# Patient Record
Sex: MALE | Race: WHITE
[De-identification: names, ages, dates, MRNs, and addresses within clinical notes are randomized per-mention and may not be internally consistent; named-entity substitution may affect disease eponyms.]

---

## 2019-03-30 ENCOUNTER — HOSPITAL ENCOUNTER (EMERGENCY)
Dept: HOSPITAL 7 - FB.ED | Age: 19
Discharge: HOME | End: 2019-03-30
Payer: MEDICAID

## 2019-03-30 DIAGNOSIS — F32.9: ICD-10-CM

## 2019-03-30 DIAGNOSIS — R45.87: ICD-10-CM

## 2019-03-30 DIAGNOSIS — F34.1: Primary | ICD-10-CM

## 2019-03-30 LAB — APAP SERPL-MCNC: < 2 UG/ML (ref ?–2)

## 2019-03-30 PROCEDURE — 84443 ASSAY THYROID STIM HORMONE: CPT

## 2019-03-30 PROCEDURE — 81001 URINALYSIS AUTO W/SCOPE: CPT

## 2019-03-30 PROCEDURE — 80305 DRUG TEST PRSMV DIR OPT OBS: CPT

## 2019-03-30 PROCEDURE — 96374 THER/PROPH/DIAG INJ IV PUSH: CPT

## 2019-03-30 PROCEDURE — G0480 DRUG TEST DEF 1-7 CLASSES: HCPCS

## 2019-03-30 PROCEDURE — 93005 ELECTROCARDIOGRAM TRACING: CPT

## 2019-03-30 PROCEDURE — 99283 EMERGENCY DEPT VISIT LOW MDM: CPT

## 2019-03-30 PROCEDURE — 36415 COLL VENOUS BLD VENIPUNCTURE: CPT

## 2019-03-30 PROCEDURE — 85025 COMPLETE CBC W/AUTO DIFF WBC: CPT

## 2019-03-30 PROCEDURE — 96361 HYDRATE IV INFUSION ADD-ON: CPT

## 2019-03-30 PROCEDURE — 80053 COMPREHEN METABOLIC PANEL: CPT

## 2019-03-30 PROCEDURE — C9113 INJ PANTOPRAZOLE SODIUM, VIA: HCPCS

## 2019-03-30 NOTE — EDM.PDOC
ED HPI GENERAL MEDICAL PROBLEM





- General


Stated Complaint: OVERDOSE


Time Seen by Provider: 03/30/19 05:40


Source of Information: Reports: Patient


History Limitations: Reports: No Limitations





- History of Present Illness


INITIAL COMMENTS - FREE TEXT/NARRATIVE: 





This pleasant 22-year-old [facility] [employer] 

major student with know depression and hx of parents divorce at 1 year of age, 

and father known to have suicidal ideation and his father had hx of being 

treated in [facility] for six weeks. 





At 3:30 AM today patient took 100 tablets ibuprofen and 3 tablets of 100 

milligrams Zoloft. He drove from [place] to [place] to his mother's house. 

Mother then prompted brought him to the ED.





He is is suicidal "thinks about it sometimes," feels helpless and hopeless, 

feels guilty, has decreased energy, concentration is variable and approximately 

50% of the time is decreased, he feels anxious, is sad, does not have delusions 

or hallucinations nor does he have homicidal thoughts.


And his been treated for depression with Zoloft her milligrams daily





His weight has increased lately. Currently weighs 20 pounds.





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 

its first performance Thursday April 4th.





Mother notes he "takes on too much." "He is overwhelmed" and she thinks "his 

grades are in trouble."





No history of previous suicidal behavior. He has had long-standing depression





ED ROS GENERAL





- Review of Systems


Review Of Systems: ROS reveals no pertinent complaints other than HPI.


Constitutional: Reports: No Symptoms


HEENT: Reports: No Symptoms


Respiratory: Reports: Other (History of asthma)


Cardiovascular: Reports: No Symptoms


Endocrine: Reports: No Symptoms


GI/Abdominal: Reports: No Symptoms


: Reports: No Symptoms


Musculoskeletal: Reports: No Symptoms


Skin: Reports: No Symptoms


Neurological: Reports: No Symptoms


Psychiatric: Reports: Depression


Hematologic/Lymphatic: Reports: No Symptoms


Immunologic: Reports: No Symptoms





ED EXAM, GENERAL





- Physical Exam


Exam: See Below


Free Text/Narrative:: 





Pleasant quiet  appropriately communicative overweight hangman with good eye 

contact and without respiratory depression or tachycardia or diaphoresis


Exam Limited By: No Limitations


General Appearance: Alert, WD/WN, No Apparent Distress, Other (He doesn't have  

depressed dispostion)


Eye Exam: Bilateral Eye: Normal Inspection (Normal pupils normal pupil 

reactivity. No nystagmus)


Ears: Normal External Exam, Normal Canal, Hearing Grossly Normal, Normal TMs


Ear Exam: Bilateral Ear: Auricle Normal, Canal Normal, TM normal


Nose: Normal Inspection, Normal Mucosa, No Blood


Throat/Mouth: Normal Inspection, Normal Lips, Normal Teeth, Normal Gums, Normal 

Oropharynx, Normal Voice, No Airway Compromise, Other


Head: Atraumatic, Normocephalic


Neck: Normal Inspection, Supple, Non-Tender, Full Range of Motion, Other (No 

tracheal tug no thyromegaly)


Respiratory/Chest: No Respiratory Distress, Lungs Clear, Normal Breath Sounds, 

No Accessory Muscle Use, Chest Non-Tender


Cardiovascular: Normal Peripheral Pulses, Regular Rate, Rhythm, No Edema, No 

Gallop, No JVD, No Murmur, No Rub


Peripheral Pulses: 1+: Radial (R), Femoral (L), Dorsalis Pedis (L), Dorsalis 

Pedis (R)


GI/Abdominal: Normal Bowel Sounds, Soft, Non-Tender, No Organomegaly, No 

Distention, No Abnormal Bruit, No Mass, Pelvis Stable, Other (Increased 

abdominal girth)


 (Male) Exam: No Hernia, Deferred


Rectal (Males) Exam: Deferred


Back Exam: Normal Inspection, Full Range of Motion


Extremities: Normal Inspection, Normal Range of Motion, Non-Tender, No Pedal 

Edema, Normal Capillary Refill


Neurological: Alert, Oriented, CN II-XII Intact, Normal Cognition, Normal Gait, 

Normal Reflexes, No Motor/Sensory Deficits


Psychiatric: Normal Affect, Normal Mood


Skin Exam: Warm, Dry, Intact, Normal Color


Lymphatic: No Adenopathy





EKG INTERPRETATION


EKG Date: 03/30/19


Time: 06:30


Rhythm: NSR


Axis: Normal


P-Wave: Present


QRS: Normal


ST-T: Other (see notes below)


QT: Normal


EKG Interpretation Comments: 





Isolated  T-wave inversion III and V1. Flattened T-wave in V2.  Poor R-wave 

progression across the anterior precordial's. The computer reads this as "sinus 

rhythm IVCD consider right bundle-branch block." The QRS is borderline 

increased 120 ms and there is a RSR prime V1 to 3 and aVR that is borderline 

duration





Course





- Orders/Labs/Meds


Orders: 





 Active Orders 24 hr











 Category Date Time Status


 


 EKG Documentation Completion [RC] ASDIRECTED Care  03/30/19 06:22 Ordered


 


 EKG Documentation Completion [RC] ASDIRECTED Care  03/30/19 06:22 Ordered


 


 ACETAMINOPHEN [CHEM] Stat Lab  03/30/19 06:06 Received


 


 Blood Alcohol [ETHANOL BLOOD MEDICAL] [CHEM] Stat Lab  03/30/19 06:06 Received


 


 CBC WITH AUTO DIFF [HEME] Stat Lab  03/30/19 06:06 Received


 


 COMPREHENSIVE METABOLIC PN,CMP [CHEM] Stat Lab  03/30/19 06:06 Received


 


 DRUG SCREEN, URINE ALERE [URCHEM] Stat Lab  03/30/19 06:10 Received


 


 SALICYLATE [CHEM] Stat Lab  03/30/19 06:06 Received


 


 Sodium Chloride 0.9% [Normal Saline] 1,000 ml Med  03/30/19 06:00 Active





 IV ASDIRECTED   


 


 EKG 12 Lead [EK] Routine Ther  03/30/19 06:22 Ordered








 Medication Orders





Sodium Chloride (Normal Saline)  1,000 mls @ 150 mls/hr IV ASDIRECTED AYAAN








Meds: 





Medications











Generic Name Dose Route Start Last Admin





  Trade Name Freq  PRN Reason Stop Dose Admin


 


Sodium Chloride  1,000 mls @ 150 mls/hr  03/30/19 06:00  





  Normal Saline  IV   





  ASDIRECTED AYAAN   





     





     





     





     














Discontinued Medications














Generic Name Dose Route Start Last Admin





  Trade Name Freq  PRN Reason Stop Dose Admin


 


Al Hydroxide/Mg Hydroxide  30 ml  03/30/19 06:02  





  Mag-Al Susp  PO  03/30/19 06:03  





  ONETIME ONE   





     





     





     





     


 


Pantoprazole Sodium  40 mg  03/30/19 05:51  





  Protonix Iv***  IVPUSH  03/30/19 05:52  





  ONETIME ONE   





     





     





     





     














- Re-Assessments/Exams


Free Text/Narrative Re-Assessment/Exam: 





03/30/19 07:01


Patient's status has been discussed poison control with Dr. Hampton. 


Care is turned over to Dr. Hampton.


03/30/19 07:06





Free Text/Narrative Re-Assessment/Exam: 





03/30/19 07:13


Discussion with poison control. Their advice: At 9:30 AM repeat metabolic 

panel. Ibuprofen can cause acute kidney injury and acidosis. And if is stable 

then Dr Hampton can have patient to transfered to psychiatric care unit





Departure





- Departure


Time of Disposition: 06:45 (He is depressed. He has had depression 5 years (

dysthymia). He is depressed because he's taking 18 credits/ semester. He is not 

performing in his usual "B"  level but he is getting C's.)


Disposition: DC/Tfer to [facility]


Condition: Fair


Clinical Impression: 


 Suicidal ideation








- Discharge Information


*PRESCRIPTION DRUG MONITORING PROGRAM REVIEWED*: Not Applicable


*COPY OF PRESCRIPTION DRUG MONITORING REPORT IN PATIENT AGATA: Not Applicable


Referrals: 


PCP,None [Primary Care Provider] - 





- My Orders


Last 24 Hours: 





My Active Orders





03/30/19 06:00


Sodium Chloride 0.9% [Normal Saline] 1,000 ml IV ASDIRECTED 





03/30/19 06:06


ACETAMINOPHEN [CHEM] Stat 


Blood Alcohol [ETHANOL BLOOD MEDICAL] [CHEM] Stat 


CBC WITH AUTO DIFF [HEME] Stat 


COMPREHENSIVE METABOLIC PN,CMP [CHEM] Stat 


SALICYLATE [CHEM] Stat 





03/30/19 06:10


DRUG SCREEN, URINE ALERE [URCHEM] Stat 





03/30/19 06:22


EKG Documentation Completion [RC] ASDIRECTED 


EKG Documentation Completion [RC] ASDIRECTED 


EKG 12 Lead [EK] Routine 














- Assessment/Plan


Last 24 Hours: 





My Active Orders





03/30/19 06:00


Sodium Chloride 0.9% [Normal Saline] 1,000 ml IV ASDIRECTED 





03/30/19 06:06


ACETAMINOPHEN [CHEM] Stat 


Blood Alcohol [ETHANOL BLOOD MEDICAL] [CHEM] Stat 


CBC WITH AUTO DIFF [HEME] Stat 


COMPREHENSIVE METABOLIC PN,CMP [CHEM] Stat 


SALICYLATE [CHEM] Stat 





03/30/19 06:10


DRUG SCREEN, URINE ALERE [URCHEM] Stat 





03/30/19 06:22


EKG Documentation Completion [RC] ASDIRECTED 


EKG Documentation Completion [RC] ASDIRECTED 


EKG 12 Lead [EK] Routine

## 2019-06-03 ENCOUNTER — HOSPITAL ENCOUNTER (EMERGENCY)
Dept: HOSPITAL 7 - FB.ED | Age: 19
Discharge: HOME | End: 2019-06-03
Payer: MEDICAID

## 2019-06-03 DIAGNOSIS — F31.9: ICD-10-CM

## 2019-06-03 DIAGNOSIS — J01.30: Primary | ICD-10-CM

## 2019-06-03 DIAGNOSIS — Z79.899: ICD-10-CM

## 2019-06-03 DIAGNOSIS — J45.909: ICD-10-CM

## 2019-06-03 PROCEDURE — 96360 HYDRATION IV INFUSION INIT: CPT

## 2019-06-03 PROCEDURE — 85025 COMPLETE CBC W/AUTO DIFF WBC: CPT

## 2019-06-03 PROCEDURE — 99283 EMERGENCY DEPT VISIT LOW MDM: CPT

## 2019-06-03 PROCEDURE — 70486 CT MAXILLOFACIAL W/O DYE: CPT

## 2019-06-03 PROCEDURE — 80048 BASIC METABOLIC PNL TOTAL CA: CPT

## 2019-06-03 PROCEDURE — 36415 COLL VENOUS BLD VENIPUNCTURE: CPT

## 2019-06-03 NOTE — EDM.PDOC
ED HPI GENERAL MEDICAL PROBLEM





- General


Chief Complaint: Allergic Reaction


Stated Complaint: HEADACHE, PAINFUL, NOSE COATARIZED


Time Seen by Provider: 06/03/19 18:26


Source of Information: Reports: Patient, Family


History Limitations: Reports: No Limitations





- History of Present Illness


INITIAL COMMENTS - FREE TEXT/NARRATIVE: 





18 y.o.w.m came to the ed due to facial pressure a few hours after he was seen 

at [facility] for nasal cauterization in [facility] to repair nasal veins. Pt 

did not have a nose bleed before the procedure. A few hours after the procedure

, pt felt sinus pressure. Np F/C no SOB. No other acute med issues. /91 

RR 18 Pulse ox 98% on RA Temp 36.6  Pulse 84 


Onset Date: 06/03/19


Onset Time: 14:00


Duration: Hour(s):, Intermittent, Improving


Location: Reports: Face


Quality: Reports: Dull


Severity: Mild


Improves with: Reports: Rest


Worsens with: Reports: Movement


Context: Reports: Other


Associated Symptoms: Reports: Other (sinus pressure)





- Related Data


 Allergies











Allergy/AdvReac Type Severity Reaction Status Date / Time


 


No Known Allergies Allergy   Verified 06/03/19 18:35











Home Meds: 


 Home Meds





Sertraline [Zoloft] 100 mg PO DAILY 03/30/19 [History]


Amoxicillin/Potassium Clav [Augmentin 875-125 Tablet] 1 each PO BID #20 tablet 

06/03/19 [Rx]


Montelukast [Singulair] 10 mg PO DAILY 06/03/19 [History]


hydrOXYzine HCl [Atarax] 25 mg PO BID 06/03/19 [History]


lamoTRIgine [Lamotrigine] 100 mg PO DAILY 06/03/19 [History]











Past Medical History


Respiratory History: Reports: Asthma


Psychiatric History: Reports: Bipolar





- Past Surgical History


HEENT Surgical History: Reports: Other (See Below)


Other HEENT Surgeries/Procedures: [place] teeth extraction.


Dermatological Surgical History: Reports: Other (See Below)





ED ROS ALLERGIC REACTION





- Review of Systems


Review Of Systems: See Below


Constitutional: Reports: No Symptoms


HEENT: Reports: Sinus Problem


Respiratory: Reports: No Symptoms


Cardiovascular: Reports: No Symptoms


Endocrine: Reports: No Symptoms


GI/Abdominal: Reports: No Symptoms


: Reports: No Symptoms


Musculoskeletal: Reports: No Symptoms


Skin: Reports: No Symptoms


Neurological: Reports: No Symptoms


Psychiatric: Reports: No Symptoms


Hematologic/Lymphatic: Reports: No Symptoms


Immunologic: Reports: No Symptoms





ED EXAM GENERAL NO PERIP PULSE





- Physical Exam


Exam: See Below


Exam Limited By: No Limitations


General Appearance: Alert, WD/WN, Mild Distress, Obese (Morbid)


Eye Exam: Bilateral Eye: Normal Inspection


Ears: Normal External Exam, Normal Canal


Nose: Normal Inspection, Normal Mucosa, No Blood


Throat/Mouth: Normal Inspection, Normal Lips, Normal Voice, No Airway Compromise


Head: Atraumatic, Normocephalic


Neck: Normal Inspection, Supple, Non-Tender, Full Range of Motion


Respiratory/Chest: No Respiratory Distress, Lungs Clear, Normal Breath Sounds


Cardiovascular: Normal Peripheral Pulses


GI/Abdominal: Normal Bowel Sounds, Soft, Non-Tender, No Organomegaly, No 

Distention, No Abnormal Bruit


 (Male) Exam: Deferred


Rectal (Males) Exam: Deferred


Back Exam: Normal Inspection, Full Range of Motion


Extremities: Normal Inspection, Normal Range of Motion


Neurological: Alert, Oriented, CN II-XII Intact, Normal Cognition, Normal Gait


Psychiatric: Normal Affect, Normal Mood


Skin Exam: Warm, Dry, Intact


Lymphatic: No Adenopathy





Course





- Vital Signs


Text/Narrative:: 








18 y.o.w.m came to the ed due to facial pressure a few hours after he was seen 

at [facility] for nasal cauterization in [facility] to repair nasal veins. Pt 

did not have a nose bleed before the procedure. A few hours after the procedure

, pt felt sinus pressure. Np F/C no SOB. No other acute med issues. /91 

RR 18 Pulse ox 98% on RA Temp 36.6  Pulse 84


PE: Morbid obese 18 y.o.w.m with sinus pressure


Imaging: Facial CT showed an air fluid level at the pernasal sinuses, official 

report is pending


labs: CBC and BMP were nl, His WBC was 12.5 however


Impression: Facial pressure after  Nasal procedure, sinusitis


Tx; Augmentin, nasal spray


Reexam: Improved


Plan: D/C with instructions


Last Recorded V/S: 


 Last Vital Signs











Temp  36.7 C   06/03/19 18:27


 


Pulse  64   06/03/19 19:34


 


Resp  18   06/03/19 19:34


 


BP  139/93 H  06/03/19 19:34


 


Pulse Ox  100   06/03/19 19:34














- Orders/Labs/Meds


Orders: 


 Active Orders 24 hr











 Category Date Time Status


 


 Max Facial Sinus wo Cont [CT] Stat Exams  06/03/19 18:45 Taken











Labs: 


 Laboratory Tests











  06/03/19 06/03/19 Range/Units





  19:05 19:05 


 


WBC  12.3 H   (4.5-12.0)  X10-3/uL


 


RBC  5.31   (4.30-5.75)  x10(6)uL


 


Hgb  16.4   (13.5-17.8)  g/dL


 


Hct  46.5   (30.0-51.3)  %


 


MCV  87.7   (80-96)  fL


 


MCH  30.8   (27.7-33.6)  pg


 


MCHC  35.2   (32.2-35.4)  g/dL


 


RDW  12.2   (11.5-15.5)  %


 


Plt Count  307   (125-369)  X10(3)uL


 


MPV  8.5   (7.4-10.4)  fL


 


Neut % (Auto)  76.0   (46-82)  %


 


Lymph % (Auto)  15.4   (13-37)  %


 


Mono % (Auto)  6.3   (4-12)  %


 


Eos % (Auto)  2   (1.0-5.0)  %


 


Baso % (Auto)  0   (0-2)  %


 


Neut # (Auto)  9.4 H   (1.6-8.3)  #


 


Lymph # (Auto)  1.9   (0.6-5.0)  #


 


Mono # (Auto)  0.8   (0.0-1.3)  #


 


Eos # (Auto)  0.2   (0.0-0.8)  #


 


Baso # (Auto)  0.0   (0.0-0.2)  #


 


Sodium   142  (135-145)  mmol/L


 


Potassium   4.0  (3.5-5.3)  mmol/L


 


Chloride   107  (100-110)  mmol/L


 


Carbon Dioxide   25  (21-32)  mmol/L


 


BUN   14  (7-18)  mg/dL


 


Creatinine   1.1  (0.70-1.30)  mg/dL


 


Est Cr Clr Drug Dosing   TNP  


 


Estimated GFR (MDRD)   > 60  (>60)  


 


BUN/Creatinine Ratio   12.7  (9-20)  


 


Glucose   112  ()  mg/dL


 


Calcium   9.3  (8.6-10.2)  mg/dL











Meds: 


Medications














Discontinued Medications














Generic Name Dose Route Start Last Admin





  Trade Name Osvaldo  PRN Reason Stop Dose Admin


 


Amoxicillin/Clavulanate Potassium  1 tab  06/03/19 20:45  06/03/19 20:50





  Augmentin 875 Mg/125 Mg  PO  06/03/19 20:46  1 tab





  ONETIME ONE   Administration





     





     





     





     


 


Sodium Chloride  1,000 mls @ 999 mls/hr  06/03/19 18:44  06/03/19 19:14





  Normal Saline  IV  06/03/19 19:44  999 mls/hr





  .BOLUS ONE   Administration





     





     





     





     














Departure





- Departure


Time of Disposition: 20:40


Disposition: Home, Self-Care 01


Condition: Good


Clinical Impression: 


Sinusitis nasal


Qualifiers:


 Sinusitis location: sphenoidal Chronicity: acute 








- Discharge Information


Prescriptions: 


Amoxicillin/Potassium Clav [Augmentin 875-125 Tablet] 1 each PO BID #20 tablet


Instructions:  Amoxicillin; Clavulanic Acid tablets, Sinusitis, Adult, Easy-to-

Read


Referrals: 


PCP,None [Primary Care Provider] - 


Forms:  ED Department Discharge


Additional Instructions: 


Please take the ABx as recommended, please take tylenol/motrin for pain, Please 

f/u with ENT as needed, please come back if your symptoms gt worse acutely





- My Orders


Last 24 Hours: 


My Active Orders





06/03/19 18:45


Max Facial Sinus wo Cont [CT] Stat 














- Assessment/Plan


Last 24 Hours: 


My Active Orders





06/03/19 18:45


Max Facial Sinus wo Cont [CT] Stat

## 2019-06-04 NOTE — CT
INDICATION: 



CT PARANASAL SINUSES:  Spiral axial imagining of the paranasal sinuses was 
obtained with sagittal and coronal reconstructions 06/03/19--no comparisons. 



Total exam DLP = 853.53 mGy-cm. 



Frothy appearing material is noted in the right frontal air cells with 
opacification of multiple ethmoidal air cells and air-fluid level in the right 
maxillary antrum and what appear to be retention cysts bilaterally in the 
maxillary antra. There is opacification of almost the entire left nasal passage 
with the right nasal passage moderately patent. No definite bony erosion was 
seen. 



IMPRESSION: Areas of opacification, air-fluid level and frothy material in the 
paranasal sinuses with what appear to be retention cysts. This appearance could 
be on the basis of acute sinusitis. However, the possibility that it represents 
post-op changes with hemorrhage after recent operative procedure this afternoon
, would also be consideration and findings should be correlated clinically. 



The report was called to Dr. Vidales at 2010 hours.  
FRANCISCA